# Patient Record
Sex: FEMALE | Race: ASIAN | NOT HISPANIC OR LATINO | Employment: OTHER | URBAN - METROPOLITAN AREA
[De-identification: names, ages, dates, MRNs, and addresses within clinical notes are randomized per-mention and may not be internally consistent; named-entity substitution may affect disease eponyms.]

---

## 2018-07-11 ENCOUNTER — OFFICE VISIT (OUTPATIENT)
Dept: PRIMARY CARE CLINIC | Facility: CLINIC | Age: 73
End: 2018-07-11
Payer: MEDICARE

## 2018-07-11 ENCOUNTER — TELEPHONE (OUTPATIENT)
Dept: PRIMARY CARE CLINIC | Facility: CLINIC | Age: 73
End: 2018-07-11

## 2018-07-11 VITALS
WEIGHT: 206.38 LBS | RESPIRATION RATE: 18 BRPM | OXYGEN SATURATION: 97 % | SYSTOLIC BLOOD PRESSURE: 137 MMHG | DIASTOLIC BLOOD PRESSURE: 84 MMHG | HEIGHT: 63 IN | BODY MASS INDEX: 36.57 KG/M2 | TEMPERATURE: 98 F | HEART RATE: 81 BPM

## 2018-07-11 DIAGNOSIS — M54.42 ACUTE MIDLINE LOW BACK PAIN WITH BILATERAL SCIATICA: Primary | ICD-10-CM
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
## 2018-07-11 DIAGNOSIS — M51.9 LUMBAR DISC DISEASE: ICD-10-CM

## 2018-07-11 DIAGNOSIS — M54.41 ACUTE MIDLINE LOW BACK PAIN WITH BILATERAL SCIATICA: Primary | ICD-10-CM

## 2018-07-11 DIAGNOSIS — S32.009A LUMBAR VERTERBRAL FRACTURE, TRAUMATIC: ICD-10-CM

## 2018-07-11 DIAGNOSIS — E11.9 TYPE 2 DIABETES MELLITUS WITHOUT COMPLICATION, WITHOUT LONG-TERM CURRENT USE OF INSULIN: ICD-10-CM

## 2018-07-11 PROCEDURE — 99213 OFFICE O/P EST LOW 20 MIN: CPT | Mod: S$PBB,,, | Performed by: INTERNAL MEDICINE

## 2018-07-11 PROCEDURE — 96372 THER/PROPH/DIAG INJ SC/IM: CPT | Mod: PBBFAC,PN

## 2018-07-11 PROCEDURE — 99999 PR PBB SHADOW E&M-EST. PATIENT-LVL IV: CPT | Mod: PBBFAC,,, | Performed by: INTERNAL MEDICINE

## 2018-07-11 PROCEDURE — 99214 OFFICE O/P EST MOD 30 MIN: CPT | Mod: PBBFAC,PN | Performed by: INTERNAL MEDICINE

## 2018-07-11 RX ORDER — ATORVASTATIN CALCIUM 10 MG/1
10 TABLET, FILM COATED ORAL DAILY
Qty: 90 TABLET | Refills: 3 | Status: SHIPPED | OUTPATIENT
Start: 2018-07-11 | End: 2019-02-26 | Stop reason: SDUPTHER

## 2018-07-11 RX ORDER — KETOROLAC TROMETHAMINE 30 MG/ML
30 INJECTION, SOLUTION INTRAMUSCULAR; INTRAVENOUS
Status: COMPLETED | OUTPATIENT
Start: 2018-07-11 | End: 2018-07-11

## 2018-07-11 RX ORDER — SITAGLIPTIN 100 MG/1
1 TABLET, FILM COATED ORAL DAILY
COMMUNITY
Start: 2018-06-28 | End: 2018-07-11 | Stop reason: SDUPTHER

## 2018-07-11 RX ORDER — OMEPRAZOLE 40 MG/1
40 CAPSULE, DELAYED RELEASE ORAL DAILY PRN
COMMUNITY
Start: 2018-06-28 | End: 2018-07-11 | Stop reason: SDUPTHER

## 2018-07-11 RX ORDER — GLIMEPIRIDE 4 MG/1
1 TABLET ORAL 2 TIMES DAILY
COMMUNITY
Start: 2018-06-28 | End: 2018-07-11 | Stop reason: SDUPTHER

## 2018-07-11 RX ORDER — PREDNISONE 20 MG/1
20 TABLET ORAL 2 TIMES DAILY
Qty: 14 TABLET | Refills: 0 | Status: SHIPPED | OUTPATIENT
Start: 2018-07-11 | End: 2018-07-11 | Stop reason: SDUPTHER

## 2018-07-11 RX ORDER — VALSARTAN 320 MG/1
320 TABLET ORAL NIGHTLY
Qty: 90 TABLET | Refills: 3 | Status: SHIPPED | OUTPATIENT
Start: 2018-07-11 | End: 2018-08-09

## 2018-07-11 RX ORDER — OMEPRAZOLE 40 MG/1
40 CAPSULE, DELAYED RELEASE ORAL DAILY PRN
Qty: 90 CAPSULE | Refills: 3 | Status: SHIPPED | OUTPATIENT
Start: 2018-07-11 | End: 2018-10-15

## 2018-07-11 RX ORDER — PREDNISONE 20 MG/1
20 TABLET ORAL 2 TIMES DAILY
Qty: 14 TABLET | Refills: 0 | Status: SHIPPED | OUTPATIENT
Start: 2018-07-11 | End: 2018-07-18

## 2018-07-11 RX ORDER — GLIMEPIRIDE 4 MG/1
4 TABLET ORAL 2 TIMES DAILY
Qty: 90 TABLET | Refills: 3 | Status: SHIPPED | OUTPATIENT
Start: 2018-07-11 | End: 2019-02-26 | Stop reason: SDUPTHER

## 2018-07-11 RX ORDER — VALSARTAN 320 MG/1
1 TABLET ORAL NIGHTLY
COMMUNITY
Start: 2018-06-28 | End: 2018-07-11 | Stop reason: SDUPTHER

## 2018-07-11 RX ORDER — TIZANIDINE 4 MG/1
4 TABLET ORAL EVERY 8 HOURS
Qty: 30 TABLET | Refills: 0 | Status: SHIPPED | OUTPATIENT
Start: 2018-07-11 | End: 2018-07-13

## 2018-07-11 RX ORDER — ATORVASTATIN CALCIUM 10 MG/1
10 TABLET, FILM COATED ORAL DAILY
COMMUNITY
Start: 2018-06-28 | End: 2018-07-11 | Stop reason: SDUPTHER

## 2018-07-11 RX ORDER — METFORMIN HYDROCHLORIDE 1000 MG/1
1000 TABLET ORAL 2 TIMES DAILY
Qty: 90 TABLET | Refills: 3 | Status: SHIPPED | OUTPATIENT
Start: 2018-07-11 | End: 2019-02-26 | Stop reason: SDUPTHER

## 2018-07-11 RX ORDER — AMLODIPINE BESYLATE 10 MG/1
10 TABLET ORAL DAILY
COMMUNITY
Start: 2018-06-28 | End: 2018-07-11 | Stop reason: SDUPTHER

## 2018-07-11 RX ORDER — METFORMIN HYDROCHLORIDE 1000 MG/1
1 TABLET ORAL 2 TIMES DAILY
COMMUNITY
Start: 2018-06-28 | End: 2018-07-11 | Stop reason: SDUPTHER

## 2018-07-11 RX ORDER — TRAMADOL HYDROCHLORIDE 50 MG/1
50 TABLET ORAL EVERY 8 HOURS PRN
Qty: 30 TABLET | Refills: 0 | Status: SHIPPED | OUTPATIENT
Start: 2018-07-11 | End: 2018-07-13

## 2018-07-11 RX ORDER — AMLODIPINE BESYLATE 10 MG/1
10 TABLET ORAL DAILY
Qty: 90 TABLET | Refills: 3 | Status: SHIPPED | OUTPATIENT
Start: 2018-07-11 | End: 2019-02-26 | Stop reason: SDUPTHER

## 2018-07-11 RX ORDER — SITAGLIPTIN 100 MG/1
100 TABLET, FILM COATED ORAL DAILY
Qty: 90 TABLET | Refills: 3 | Status: SHIPPED | OUTPATIENT
Start: 2018-07-11 | End: 2019-02-26 | Stop reason: SDUPTHER

## 2018-07-11 RX ADMIN — KETOROLAC TROMETHAMINE 30 MG: 30 INJECTION, SOLUTION INTRAMUSCULAR at 03:07

## 2018-07-11 NOTE — PROGRESS NOTES
Patient ID by name and . NKDA. Toradol 30 mg IM injection given IM in left ventrogluteal using aseptic technique. Aspirated with no blood noted. Patient tolerated well. Given per physicians order. No adverse reaction noted.

## 2018-07-11 NOTE — TELEPHONE ENCOUNTER
----- Message from Jayla Motta sent at 7/11/2018  1:26 PM CDT -----  Contact: Daughter-in-law  Type:  Patient Returning Call    Who Called:  Marychuy, daughter-in-law  Who Left Message for Patient:  Josey  Does the patient know what this is regarding?:  Same day appointment  Best Call Back Number:  [phone]  Additional Information:  Missed your call, please call her back. Thanks.

## 2018-07-11 NOTE — TELEPHONE ENCOUNTER
----- Message from Michelle Hernandez sent at 7/11/2018 12:27 PM CDT -----  Contact: daughter  Marychuy Tracy   [phone]  This will be a new patient her daughter spoke to your sister and asked if you will see her mother today she fell and hurt her  Back. please call back at [phone]

## 2018-07-12 NOTE — PATIENT INSTRUCTIONS
May need MRI and discuss PT after xray result  May need increase DM meds few days while on po prednisone accucheck was 190 in office today

## 2018-07-12 NOTE — PROGRESS NOTES
Subjective:       Patient ID: Fredi Layne is a 73 y.o. female.    Chief Complaint: Back Pain    HPI  Pt with h/o lS disc ds with spinal stenoses in past had SEI still with pain on and off few days ago help  get up pt slipped from chair landed on buttock c/o increase lower back pain since then radiate to lower abdomen increase when ambulating  Review of Systems    Objective:      Physical Exam   Constitutional: She is oriented to person, place, and time. She appears well-developed and well-nourished. No distress.   HENT:   Head: Normocephalic and atraumatic.   Right Ear: External ear normal.   Left Ear: External ear normal.   Nose: Nose normal.   Mouth/Throat: Oropharynx is clear and moist. No oropharyngeal exudate.   Eyes: Conjunctivae and EOM are normal. Pupils are equal, round, and reactive to light. Right eye exhibits no discharge. Left eye exhibits no discharge.   Neck: Normal range of motion. Neck supple. No thyromegaly present.   Cardiovascular: Normal rate, regular rhythm, normal heart sounds and intact distal pulses.  Exam reveals no gallop and no friction rub.    No murmur heard.  Pulmonary/Chest: Effort normal and breath sounds normal. No respiratory distress. She has no wheezes. She has no rales. She exhibits no tenderness.   Abdominal: Soft. Bowel sounds are normal. She exhibits no distension. There is no tenderness. There is no rebound and no guarding.   Musculoskeletal: Normal range of motion. She exhibits no edema, tenderness or deformity.   Lymphadenopathy:     She has no cervical adenopathy.   Neurological: She is alert and oriented to person, place, and time.   Skin: Skin is warm and dry. Capillary refill takes less than 2 seconds. No rash noted. No erythema.   Psychiatric: She has a normal mood and affect. Judgment and thought content normal.   Nursing note and vitals reviewed.      Assessment:       1. Acute midline low back pain with bilateral sciatica    2. Lumbar disc disease    3. Fall,  initial encounter    4. Type 2 diabetes mellitus without complication, without long-term current use of insulin    5. Lumbar verterbral fracture, traumatic        Plan:       Acute midline low back pain with bilateral sciatica    Lumbar disc disease  -     omeprazole (PRILOSEC) 40 MG capsule; Take 1 capsule (40 mg total) by mouth daily as needed.  Dispense: 90 capsule; Refill: 3  -     amLODIPine (NORVASC) 10 MG tablet; Take 1 tablet (10 mg total) by mouth once daily.  Dispense: 90 tablet; Refill: 3  -     Discontinue: predniSONE (DELTASONE) 20 MG tablet; Take 1 tablet (20 mg total) by mouth 2 (two) times daily. for 7 days  Dispense: 14 tablet; Refill: 0  -     tiZANidine (ZANAFLEX) 4 MG tablet; Take 1 tablet (4 mg total) by mouth every 8 (eight) hours. for 10 days  Dispense: 30 tablet; Refill: 0  -     predniSONE (DELTASONE) 20 MG tablet; Take 1 tablet (20 mg total) by mouth 2 (two) times daily. for 7 days  Dispense: 14 tablet; Refill: 0    Fall, initial encounter    Type 2 diabetes mellitus without complication, without long-term current use of insulin  -     valsartan (DIOVAN) 320 MG tablet; Take 1 tablet (320 mg total) by mouth every evening.  Dispense: 90 tablet; Refill: 3  -     metFORMIN (GLUCOPHAGE) 1000 MG tablet; Take 1 tablet (1,000 mg total) by mouth 2 (two) times daily.  Dispense: 90 tablet; Refill: 3  -     JANUVIA 100 mg Tab; Take 1 tablet (100 mg total) by mouth once daily.  Dispense: 90 tablet; Refill: 3  -     glimepiride (AMARYL) 4 MG tablet; Take 1 tablet (4 mg total) by mouth 2 (two) times daily.  Dispense: 90 tablet; Refill: 3  -     atorvastatin (LIPITOR) 10 MG tablet; Take 1 tablet (10 mg total) by mouth once daily.  Dispense: 90 tablet; Refill: 3    Lumbar verterbral fracture, traumatic  -     X-Ray Lumbar Spine Ap And Lateral; Future; Expected date: 07/11/2018  -     traMADol (ULTRAM) 50 mg tablet; Take 1 tablet (50 mg total) by mouth every 8 (eight) hours as needed.  Dispense: 30 tablet;  Refill: 0  -     ketorolac injection 30 mg; Inject 1 mL (30 mg total) into the muscle one time.

## 2018-07-13 ENCOUNTER — TELEPHONE (OUTPATIENT)
Dept: PRIMARY CARE CLINIC | Facility: CLINIC | Age: 73
End: 2018-07-13

## 2018-07-13 NOTE — TELEPHONE ENCOUNTER
----- Message from Keishahilda Brice sent at 7/13/2018  2:43 PM CDT -----  Patients son states that he need to speak to you in regards to patients test results.  Please call Carlton at [phone].

## 2018-07-16 ENCOUNTER — OFFICE VISIT (OUTPATIENT)
Dept: PRIMARY CARE CLINIC | Facility: CLINIC | Age: 73
End: 2018-07-16
Payer: MEDICARE

## 2018-07-16 VITALS
OXYGEN SATURATION: 95 % | SYSTOLIC BLOOD PRESSURE: 126 MMHG | TEMPERATURE: 98 F | HEIGHT: 62 IN | RESPIRATION RATE: 18 BRPM | HEART RATE: 72 BPM | BODY MASS INDEX: 37.91 KG/M2 | WEIGHT: 206 LBS | DIASTOLIC BLOOD PRESSURE: 78 MMHG

## 2018-07-16 DIAGNOSIS — M51.9 LUMBAR DISC DISEASE: ICD-10-CM

## 2018-07-16 DIAGNOSIS — M54.42 ACUTE MIDLINE LOW BACK PAIN WITH BILATERAL SCIATICA: Primary | ICD-10-CM

## 2018-07-16 DIAGNOSIS — E11.9 TYPE 2 DIABETES MELLITUS WITHOUT COMPLICATION, WITHOUT LONG-TERM CURRENT USE OF INSULIN: ICD-10-CM

## 2018-07-16 DIAGNOSIS — S32.000A LUMBAR COMPRESSION FRACTURE, CLOSED, INITIAL ENCOUNTER: ICD-10-CM

## 2018-07-16 DIAGNOSIS — R29.898 WEAKNESS OF BOTH LEGS: ICD-10-CM

## 2018-07-16 DIAGNOSIS — S32.009A LUMBAR VERTERBRAL FRACTURE, TRAUMATIC: ICD-10-CM

## 2018-07-16 DIAGNOSIS — F41.0 PANIC ATTACK: ICD-10-CM

## 2018-07-16 DIAGNOSIS — M54.41 ACUTE MIDLINE LOW BACK PAIN WITH BILATERAL SCIATICA: Primary | ICD-10-CM

## 2018-07-16 PROCEDURE — 99999 PR PBB SHADOW E&M-EST. PATIENT-LVL IV: CPT | Mod: PBBFAC,,, | Performed by: INTERNAL MEDICINE

## 2018-07-16 PROCEDURE — 99213 OFFICE O/P EST LOW 20 MIN: CPT | Mod: S$PBB,,, | Performed by: INTERNAL MEDICINE

## 2018-07-16 PROCEDURE — 99214 OFFICE O/P EST MOD 30 MIN: CPT | Mod: PBBFAC,PN | Performed by: INTERNAL MEDICINE

## 2018-07-16 RX ORDER — ALPRAZOLAM 1 MG/1
1 TABLET ORAL 2 TIMES DAILY
Qty: 60 TABLET | Refills: 0 | Status: SHIPPED | OUTPATIENT
Start: 2018-07-16 | End: 2018-10-15

## 2018-07-16 RX ORDER — OXYCODONE AND ACETAMINOPHEN 5; 325 MG/1; MG/1
1 TABLET ORAL EVERY 6 HOURS PRN
Qty: 28 TABLET | Refills: 0 | Status: SHIPPED | OUTPATIENT
Start: 2018-07-16 | End: 2018-10-15

## 2018-07-16 NOTE — PROGRESS NOTES
Subjective:       Patient ID: Fredi Layne is a 73 y.o. female.    Chief Complaint: Lumbar Spine Pain (L-Spine)    HPI  Pt still in a lot of pain specially whenn try stading up and both legs hurt and weak use wheel chair no fever chill have alolt of anxiety claustrophobic  Review of Systems    Objective:      Physical Exam   Constitutional: She is oriented to person, place, and time. She appears well-developed and well-nourished. No distress.   Wheel chair   HENT:   Head: Normocephalic and atraumatic.   Right Ear: External ear normal.   Left Ear: External ear normal.   Nose: Nose normal.   Mouth/Throat: Oropharynx is clear and moist. No oropharyngeal exudate.   Eyes: Conjunctivae and EOM are normal. Pupils are equal, round, and reactive to light. Right eye exhibits no discharge. Left eye exhibits no discharge.   Neck: Normal range of motion. Neck supple. No thyromegaly present.   Cardiovascular: Normal rate, regular rhythm, normal heart sounds and intact distal pulses.  Exam reveals no gallop and no friction rub.    No murmur heard.  Pulmonary/Chest: Effort normal and breath sounds normal. No respiratory distress. She has no wheezes. She has no rales. She exhibits no tenderness.   Abdominal: Soft. Bowel sounds are normal. She exhibits no distension. There is no tenderness. There is no rebound and no guarding.   Musculoskeletal: Normal range of motion. She exhibits tenderness (tenderness midback bilaterally). She exhibits no edema or deformity.   Lymphadenopathy:     She has no cervical adenopathy.   Neurological: She is alert and oriented to person, place, and time.   Skin: Skin is warm and dry. Capillary refill takes less than 2 seconds. No rash noted. No erythema.   Psychiatric: She has a normal mood and affect. Judgment and thought content normal.   Nursing note and vitals reviewed.      Assessment:       1. Acute midline low back pain with bilateral sciatica    2. Type 2 diabetes mellitus without complication, without  long-term current use of insulin    3. Lumbar compression fracture, closed, initial encounter    4. Lumbar disc disease    5. Lumbar verterbral fracture, traumatic    6. Panic attack    7. Weakness of both legs        Plan:       Acute midline low back pain with bilateral sciatica    Type 2 diabetes mellitus without complication, without long-term current use of insulin  -     Comprehensive metabolic panel; Future; Expected date: 07/16/2018  -     CBC auto differential; Future; Expected date: 07/16/2018  -     Hemoglobin A1c; Future; Expected date: 07/16/2018  -     Microalbumin/creatinine urine ratio; Future; Expected date: 07/16/2018    Lumbar compression fracture, closed, initial encounter  -     MRI Lumbar Spine Without Contrast; Future; Expected date: 07/16/2018  -     oxyCODONE-acetaminophen (PERCOCET) 5-325 mg per tablet; Take 1 tablet by mouth every 6 (six) hours as needed for Pain.  Dispense: 28 tablet; Refill: 0    Lumbar disc disease  -     MRI Lumbar Spine Without Contrast; Future; Expected date: 07/16/2018    Lumbar verterbral fracture, traumatic  -     MRI Lumbar Spine Without Contrast; Future; Expected date: 07/16/2018  -     oxyCODONE-acetaminophen (PERCOCET) 5-325 mg per tablet; Take 1 tablet by mouth every 6 (six) hours as needed for Pain.  Dispense: 28 tablet; Refill: 0    Panic attack  -     ALPRAZolam (XANAX) 1 MG tablet; Take 1 tablet (1 mg total) by mouth 2 (two) times daily.  Dispense: 60 tablet; Refill: 0    Weakness of both legs  -     CK; Future; Expected date: 07/16/2018

## 2018-07-18 ENCOUNTER — TELEPHONE (OUTPATIENT)
Dept: PRIMARY CARE CLINIC | Facility: CLINIC | Age: 73
End: 2018-07-18

## 2018-07-18 DIAGNOSIS — M51.9 LUMBAR DISC DISEASE: Primary | ICD-10-CM

## 2018-07-18 DIAGNOSIS — M48.061 SPINAL STENOSIS AT L4-L5 LEVEL: ICD-10-CM

## 2018-07-18 NOTE — TELEPHONE ENCOUNTER
Per PCP patient needs to see Neurosurgery. General  referral pended to PCP to sign. Please advise.

## 2018-07-18 NOTE — TELEPHONE ENCOUNTER
----- Message from Peggy Hernandez sent at 7/18/2018  3:41 PM CDT -----  Contact: Son Donnell  Type:  Patient Returning Call    Who Called:  Donnell  Who Left Message for Patient:  Josey  Does the patient know what this is regarding?:  Results and possible referral  Best Call Back Number:  [phone] (home)   Additional Information:  na

## 2018-07-18 NOTE — TELEPHONE ENCOUNTER
----- Message from Sourav Baumann MD sent at 7/18/2018  7:56 AM CDT -----  Please call the patient regarding her MRI LS let pt son know no new fx L1 fx was old has multiple ddd and disc herniation with spinal stenoses severe with nerve compression can you get pt to see Neurosurgeon at [facility] soon or Dr Tony GRISSOM at [facility]

## 2018-08-08 ENCOUNTER — CLINICAL SUPPORT (OUTPATIENT)
Dept: PRIMARY CARE CLINIC | Facility: CLINIC | Age: 73
End: 2018-08-08
Payer: MEDICARE

## 2018-08-08 ENCOUNTER — OFFICE VISIT (OUTPATIENT)
Dept: PRIMARY CARE CLINIC | Facility: CLINIC | Age: 73
End: 2018-08-08
Payer: MEDICARE

## 2018-08-08 VITALS
BODY MASS INDEX: 37.36 KG/M2 | HEART RATE: 78 BPM | HEIGHT: 62 IN | WEIGHT: 203 LBS | RESPIRATION RATE: 18 BRPM | SYSTOLIC BLOOD PRESSURE: 132 MMHG | OXYGEN SATURATION: 92 % | DIASTOLIC BLOOD PRESSURE: 80 MMHG | TEMPERATURE: 98 F

## 2018-08-08 DIAGNOSIS — G62.9 PERIPHERAL POLYNEUROPATHY: ICD-10-CM

## 2018-08-08 DIAGNOSIS — R10.84 GENERALIZED ABDOMINAL PAIN: Primary | ICD-10-CM

## 2018-08-08 DIAGNOSIS — M48.061 SPINAL STENOSIS AT L4-L5 LEVEL: ICD-10-CM

## 2018-08-08 DIAGNOSIS — S32.009A LUMBAR VERTERBRAL FRACTURE, TRAUMATIC: ICD-10-CM

## 2018-08-08 DIAGNOSIS — E11.9 TYPE 2 DIABETES MELLITUS WITHOUT COMPLICATION, WITHOUT LONG-TERM CURRENT USE OF INSULIN: ICD-10-CM

## 2018-08-08 LAB
ALBUMIN SERPL BCP-MCNC: 3.9 G/DL
ALBUMIN/CREAT UR: 284.6 UG/MG
ALP SERPL-CCNC: 101 U/L
ALT SERPL W/O P-5'-P-CCNC: 21 U/L
ANION GAP SERPL CALC-SCNC: 8 MMOL/L
AST SERPL-CCNC: 20 U/L
BASOPHILS # BLD AUTO: 0 K/UL
BASOPHILS NFR BLD: 0.3 %
BILIRUB SERPL-MCNC: 0.3 MG/DL
BUN SERPL-MCNC: 9 MG/DL
CALCIUM SERPL-MCNC: 9.5 MG/DL
CHLORIDE SERPL-SCNC: 95 MMOL/L
CHOLEST SERPL-MCNC: 147 MG/DL
CHOLEST/HDLC SERPL: 2.7 {RATIO}
CO2 SERPL-SCNC: 33 MMOL/L
CREAT SERPL-MCNC: 0.7 MG/DL
CREAT UR-MCNC: 26 MG/DL
DIFFERENTIAL METHOD: ABNORMAL
EOSINOPHIL # BLD AUTO: 0.2 K/UL
EOSINOPHIL NFR BLD: 2 %
ERYTHROCYTE [DISTWIDTH] IN BLOOD BY AUTOMATED COUNT: 14.4 %
EST. GFR  (AFRICAN AMERICAN): >60 ML/MIN/1.73 M^2
EST. GFR  (NON AFRICAN AMERICAN): >60 ML/MIN/1.73 M^2
ESTIMATED AVG GLUCOSE: 171 MG/DL
GLUCOSE SERPL-MCNC: 69 MG/DL
HBA1C MFR BLD HPLC: 7.6 %
HCT VFR BLD AUTO: 41.5 %
HDLC SERPL-MCNC: 54 MG/DL
HDLC SERPL: 36.7 %
HGB BLD-MCNC: 13.3 G/DL
LDLC SERPL CALC-MCNC: 68 MG/DL
LYMPHOCYTES # BLD AUTO: 2.5 K/UL
LYMPHOCYTES NFR BLD: 20.2 %
MCH RBC QN AUTO: 26.2 PG
MCHC RBC AUTO-ENTMCNC: 32 G/DL
MCV RBC AUTO: 82 FL
MICROALBUMIN UR DL<=1MG/L-MCNC: 74 UG/ML
MONOCYTES # BLD AUTO: 0.6 K/UL
MONOCYTES NFR BLD: 5 %
NEUTROPHILS # BLD AUTO: 9.1 K/UL
NEUTROPHILS NFR BLD: 72.5 %
NONHDLC SERPL-MCNC: 93 MG/DL
PLATELET # BLD AUTO: 397 K/UL
PMV BLD AUTO: 8.2 FL
POTASSIUM SERPL-SCNC: 4.1 MMOL/L
PROT SERPL-MCNC: 8.4 G/DL
RBC # BLD AUTO: 5.07 M/UL
SODIUM SERPL-SCNC: 136 MMOL/L
TRIGL SERPL-MCNC: 127 MG/DL
TSH SERPL DL<=0.005 MIU/L-ACNC: 1.11 UIU/ML
WBC # BLD AUTO: 12.5 K/UL

## 2018-08-08 PROCEDURE — 99999 PR PBB SHADOW E&M-EST. PATIENT-LVL II: CPT | Mod: PBBFAC,,,

## 2018-08-08 PROCEDURE — 86803 HEPATITIS C AB TEST: CPT

## 2018-08-08 PROCEDURE — 80061 LIPID PANEL: CPT

## 2018-08-08 PROCEDURE — 99214 OFFICE O/P EST MOD 30 MIN: CPT | Mod: PBBFAC,27,PN | Performed by: INTERNAL MEDICINE

## 2018-08-08 PROCEDURE — 80053 COMPREHEN METABOLIC PANEL: CPT

## 2018-08-08 PROCEDURE — 99999 PR PBB SHADOW E&M-EST. PATIENT-LVL IV: CPT | Mod: PBBFAC,,, | Performed by: INTERNAL MEDICINE

## 2018-08-08 PROCEDURE — 99212 OFFICE O/P EST SF 10 MIN: CPT | Mod: PBBFAC,PN

## 2018-08-08 PROCEDURE — 83036 HEMOGLOBIN GLYCOSYLATED A1C: CPT

## 2018-08-08 PROCEDURE — 99213 OFFICE O/P EST LOW 20 MIN: CPT | Mod: S$PBB,,, | Performed by: INTERNAL MEDICINE

## 2018-08-08 PROCEDURE — 84443 ASSAY THYROID STIM HORMONE: CPT

## 2018-08-08 PROCEDURE — 82043 UR ALBUMIN QUANTITATIVE: CPT

## 2018-08-08 PROCEDURE — 85025 COMPLETE CBC W/AUTO DIFF WBC: CPT

## 2018-08-08 RX ORDER — GABAPENTIN 300 MG/1
300 CAPSULE ORAL 2 TIMES DAILY
Qty: 60 CAPSULE | Refills: 3 | Status: SHIPPED | OUTPATIENT
Start: 2018-08-08 | End: 2018-10-15

## 2018-08-08 RX ORDER — TIZANIDINE 4 MG/1
TABLET ORAL
COMMUNITY
Start: 2018-07-31 | End: 2018-10-15

## 2018-08-09 LAB — HCV AB SERPL QL IA: NEGATIVE

## 2018-08-09 RX ORDER — OLMESARTAN MEDOXOMIL 40 MG/1
40 TABLET ORAL DAILY
Qty: 90 TABLET | Refills: 3 | Status: SHIPPED | OUTPATIENT
Start: 2018-08-09 | End: 2019-02-26 | Stop reason: SDUPTHER

## 2018-08-09 NOTE — PROGRESS NOTES
Subjective:       Patient ID: Fredi Layne is a 73 y.o. female.    Chief Complaint: Labs / Bone Scan    HPI  Pt c/o lower back pain no new change has appt with NS next week and constipation ha sto take laxative sometime c/o indigestion has not been checking her DM at home no sob cp not able to get proper LS brace too small  Review of Systems    Objective:      Physical Exam   Constitutional: She is oriented to person, place, and time. She appears well-developed and well-nourished. No distress.   HENT:   Head: Normocephalic and atraumatic.   Right Ear: External ear normal.   Left Ear: External ear normal.   Nose: Nose normal.   Mouth/Throat: Oropharynx is clear and moist. No oropharyngeal exudate.   Eyes: Conjunctivae and EOM are normal. Pupils are equal, round, and reactive to light. Right eye exhibits no discharge. Left eye exhibits no discharge.   Neck: Normal range of motion. Neck supple. No thyromegaly present.   Cardiovascular: Normal rate, regular rhythm, normal heart sounds and intact distal pulses.  Exam reveals no gallop and no friction rub.    No murmur heard.  Pulmonary/Chest: Effort normal and breath sounds normal. No respiratory distress. She has no wheezes. She has no rales. She exhibits no tenderness.   Abdominal: Soft. Bowel sounds are normal. She exhibits no distension. There is no tenderness. There is no rebound and no guarding.   Musculoskeletal: Normal range of motion. She exhibits tenderness (tenderness in lowe back with palpation L2-3). She exhibits no edema or deformity.   Lymphadenopathy:     She has no cervical adenopathy.   Neurological: She is alert and oriented to person, place, and time.   Skin: Skin is warm and dry. Capillary refill takes less than 2 seconds. No rash noted. No erythema.   Psychiatric: She has a normal mood and affect. Judgment and thought content normal.   Nursing note and vitals reviewed.      Assessment:       1. Generalized abdominal pain    2. Lumbar verterbral fracture,  traumatic    3. Type 2 diabetes mellitus without complication, without long-term current use of insulin    4. Peripheral polyneuropathy    5. Spinal stenosis at L4-L5 level        Plan:       Generalized abdominal pain  -     POCT URINE DIPSTICK WITHOUT MICROSCOPE  -     linaclotide 290 mcg Cap; Take 1 capsule (290 mcg total) by mouth once daily.  Dispense: 30 capsule; Refill: 5    Lumbar verterbral fracture, traumatic  -     DXA Bone Density Spine And Hip; Future; Expected date: 08/08/2018    Type 2 diabetes mellitus without complication, without long-term current use of insulin  -     CBC auto differential; Future; Expected date: 08/08/2018  -     Comprehensive metabolic panel; Future; Expected date: 08/08/2018  -     Lipid panel; Future; Expected date: 08/08/2018  -     POCT EKG 12-LEAD (NOT FOR [facility] USE)  -     Hemoglobin A1c; Future; Expected date: 08/08/2018  -     TSH; Future; Expected date: 08/08/2018  -     Microalbumin/creatinine urine ratio; Future; Expected date: 08/08/2018  -     Hepatitis C antibody; Future; Expected date: 08/08/2018    Peripheral polyneuropathy  -     gabapentin (NEURONTIN) 300 MG capsule; Take 1 capsule (300 mg total) by mouth 2 (two) times daily.  Dispense: 60 capsule; Refill: 3    Spinal stenosis at L4-L5 level  Comments:  await to see NS next week continue with back brace po meds PT

## 2018-08-16 ENCOUNTER — OFFICE VISIT (OUTPATIENT)
Dept: NEUROSURGERY | Facility: CLINIC | Age: 73
End: 2018-08-16
Payer: MEDICARE

## 2018-08-16 VITALS
BODY MASS INDEX: 37.13 KG/M2 | TEMPERATURE: 98 F | WEIGHT: 203 LBS | HEART RATE: 71 BPM | DIASTOLIC BLOOD PRESSURE: 58 MMHG | SYSTOLIC BLOOD PRESSURE: 120 MMHG

## 2018-08-16 DIAGNOSIS — M43.16 SPONDYLOLISTHESIS AT L4-L5 LEVEL: Primary | ICD-10-CM

## 2018-08-16 DIAGNOSIS — S32.010D CLOSED COMPRESSION FRACTURE OF L1 LUMBAR VERTEBRA WITH ROUTINE HEALING, SUBSEQUENT ENCOUNTER: ICD-10-CM

## 2018-08-16 DIAGNOSIS — M48.061 SPINAL STENOSIS OF LUMBAR REGION WITHOUT NEUROGENIC CLAUDICATION: ICD-10-CM

## 2018-08-16 DIAGNOSIS — E66.01 MORBID OBESITY DUE TO EXCESS CALORIES: ICD-10-CM

## 2018-08-16 DIAGNOSIS — M54.50 LOW BACK PAIN, NON-SPECIFIC: ICD-10-CM

## 2018-08-16 PROCEDURE — 99999 PR PBB SHADOW E&M-EST. PATIENT-LVL IV: CPT | Mod: PBBFAC,,, | Performed by: NEUROLOGICAL SURGERY

## 2018-08-16 PROCEDURE — 99204 OFFICE O/P NEW MOD 45 MIN: CPT | Mod: S$PBB,,, | Performed by: NEUROLOGICAL SURGERY

## 2018-08-16 PROCEDURE — 99214 OFFICE O/P EST MOD 30 MIN: CPT | Mod: PBBFAC | Performed by: NEUROLOGICAL SURGERY

## 2018-08-16 NOTE — PROGRESS NOTES
History & Physical    I, Otto Jenkins, attest that this documentation has been prepared under the direction and in the presence of Jareth Zamudio MD.    08/17/2018    Chief Complaint   Patient presents with    Consult       History of Present Illness:  Fredi Layne is a 73 y.o. patient with history of osteopenia who was referred to me by Dr. Sourav Baumann MD.     According to patient's son, patient has a remote history of fall in 1991 that required hospitalization in [place]. Unclear whether patient had any bony injury, although she had continued walking with mild intermittent back pain. 6 months ago the patient fell again, was told that she had a fracture, and was referred to me for further evaluation.   Patient complains of low back pain. The pain is 8-10/10 in intensity. Her pain is constant, gets worse with standing up and sitting down, and improves when walking or when laying flat. Patient states that when doing the dishes, she feels more comfortable if she leans on the sink. Patient's son does not think that she can walk further than 2 blocks secondary exacerbation of her back pain. Patient denies any bowel/bladder incontinence.   Patient is also complaining of left shoulder pain that radiates down the left arm, associated with numbness of her whole hand. The numbness is worst at her thumb. Symptoms are alleviated with gabapentin.     Review of patient's allergies indicates:  No Known Allergies    Current Outpatient Medications   Medication Sig Dispense Refill    amLODIPine (NORVASC) 10 MG tablet Take 1 tablet (10 mg total) by mouth once daily. 90 tablet 3    atorvastatin (LIPITOR) 10 MG tablet Take 1 tablet (10 mg total) by mouth once daily. 90 tablet 3    gabapentin (NEURONTIN) 300 MG capsule Take 1 capsule (300 mg total) by mouth 2 (two) times daily. 60 capsule 3    glimepiride (AMARYL) 4 MG tablet Take 1 tablet (4 mg total) by mouth 2 (two) times daily. 90 tablet 3    JANUVIA 100 mg Tab Take 1 tablet  (100 mg total) by mouth once daily. 90 tablet 3    linaclotide 290 mcg Cap Take 1 capsule (290 mcg total) by mouth once daily. 30 capsule 5    metFORMIN (GLUCOPHAGE) 1000 MG tablet Take 1 tablet (1,000 mg total) by mouth 2 (two) times daily. 90 tablet 3    olmesartan (BENICAR) 40 MG tablet Take 1 tablet (40 mg total) by mouth once daily. 90 tablet 3    omeprazole (PRILOSEC) 40 MG capsule Take 1 capsule (40 mg total) by mouth daily as needed. 90 capsule 3    oxyCODONE-acetaminophen (PERCOCET) 5-325 mg per tablet Take 1 tablet by mouth every 6 (six) hours as needed for Pain. 28 tablet 0    tiZANidine (ZANAFLEX) 4 MG tablet       ALPRAZolam (XANAX) 1 MG tablet Take 1 tablet (1 mg total) by mouth 2 (two) times daily. 60 tablet 0     No current facility-administered medications for this visit.        Past Medical History:   Diagnosis Date    Diabetes     Diabetes mellitus, type 2     Hyperlipidemia     Hypertension     Obesity        Past Surgical History:   Procedure Laterality Date    HYSTERECTOMY      OOPHORECTOMY      bilateral removal       Family History   Family history unknown: Yes       Social History     Tobacco Use    Smoking status: Never Smoker    Smokeless tobacco: Never Used   Substance Use Topics    Alcohol use: No    Drug use: No        Review of Systems:  Review of Systems   Constitutional: Negative for activity change.   HENT: Negative for congestion.    Eyes: Negative for discharge.   Respiratory: Negative for apnea.    Cardiovascular: Negative for chest pain.   Gastrointestinal: Negative for abdominal distention.   Endocrine: Negative for cold intolerance.   Genitourinary: Negative for difficulty urinating.   Musculoskeletal: Positive for back pain. Negative for arthralgias.   Neurological: Positive for numbness. Negative for dizziness, weakness and headaches.   Psychiatric/Behavioral: Negative for agitation.       Vital Signs (Most Recent)  Temp: 98.3 °F (36.8 °C) (08/16/18  1322)  Pulse: 71 (08/16/18 1322)  BP: (!) 120/58 (08/16/18 1322)     92.1 kg (203 lb)       Physical Exam:  Physical Exam:    Constitutional: She appears well-developed.     Eyes: Pupils are equal, round, and reactive to light. EOM are normal. Right eye exhibits no discharge. Left eye exhibits no discharge.     Abdominal: Soft.     Skin: Skin displays no rash on trunk and no rash on extremities.     Psych/Behavior: She is alert. She is oriented to person, place, and time.     Musculoskeletal:        Neck: There is no tenderness.        Back: Range of motion is full.        Right Upper Extremities: Range of motion is full. Muscle strength is 5/5. Tone is normal.        Left Upper Extremities: Range of motion is full. Muscle strength is 5/5. Tone is normal.       Right Lower Extremities: Range of motion is full. Muscle strength is 5/5. Tone is normal.        Left Lower Extremities: Range of motion is full. Muscle strength is 5/5. Tone is normal.     Neurological:        Coordination: She has a normal Romberg Test and normal tandem walking coordination.        Sensory: There is no sensory deficit in the trunk. There is no sensory deficit in the extremities.        DTRs: DTRs are DTRS NORMAL AND SYMMETRICnormal and symmetric. Tricep reflexes are 2+ on the right side and 2+ on the left side. Bicep reflexes are 2+ on the right side and 2+ on the left side. Brachioradialis reflexes are 2+ on the right side and 2+ on the left side. Patellar reflexes are 2+ on the right side and 2+ on the left side. Achilles reflexes are 2+ on the right side and 2+ on the left side. She displays no Babinski's sign on the right side. She displays no Babinski's sign on the left side.        Cranial nerves: Cranial nerve(s) II, III, IV, V, VI, VII, VIII, IX, X, XI and XII are intact.     Negative Scanlon's bilaterally.    negative straight leg raising test    Laboratory  CBC: Reviewed  CMP: Reviewed    Diagnostic Results:  MRI: Reviewed  MRI  Lumbar Spine Without Contrast, dated 7/16/2018: Reviewed personally, and discussed them with the patient and her son.   1. Chronic L1 vertebral body large central Schmorl's node and compression fracture which is remote, does not show any retropulsed fragments or cord compression.  2. Small endplate Schmorl's nodes T12 without edema.  3. Multilevel discogenic and spondylitic disease with varying degrees of central canal and/or foraminal stenosis.  4. Grade 1 spondylolisthesis L4-5 with severe facet and ligamentous hypertrophy causing severe central canal stenosis mild-to-moderate bilateral foraminal stenosis.  The preliminary and final reports are partially concordant.      ASSESSMENT/PLAN:       ICD-10-CM ICD-9-CM   1. Spondylolisthesis at L4-L5 level M43.16 756.12   2. Low back pain, non-specific M54.5 724.2   3. Closed compression fracture of L1 lumbar vertebra with routine healing, subsequent encounter S32.010D V54.17   4. Spinal stenosis of lumbar region without neurogenic claudication M48.061 724.02   5. Morbid obesity due to excess calories E66.01 278.01       PLAN:    1. Multilevel lumbar spondylosis with an old L1 fracture and L4-5 grade I spondylolisthesis with severe stenosis and chronic low back pain in the setting of morbid obesity, and diffuse lumbar spondylosis. We  Will start aggressive conservative management, will refer her for L4-5 injections and start her on Celebrex for 2 weeks. I will also get a CT scan of the lumbar spine to evaluate if there is bilateral pars fractures at L4 and will get flexion extension x-rays of the lumbar spine to evaluate for dynamic instability.     2. RTC in 6 weeks.     3. I have recommended the patient to get into a weight loss regimen and I have explained to her that it is critical in order to have a healthy back.     4. I spent 35 minutes with the patient, 50% of time in counseling.     Xa was seen today for consult.    Diagnoses and all orders for this  visit:    Spondylolisthesis at L4-L5 level    Low back pain, non-specific  -     IR LEROY Lumbar w/ Img; Future  -     IR LEROY Lumbar; Future  -     celecoxib (CELEBREX) 200 MG capsule; Take 1 capsule (200 mg total) by mouth 2 (two) times daily. for 14 days  -     CT Lumbar Spine Without Contrast; Future  -     X-Ray Lumbar Spine Flexion And Extension Only; Future    Closed compression fracture of L1 lumbar vertebra with routine healing, subsequent encounter    Spinal stenosis of lumbar region without neurogenic claudication    Morbid obesity due to excess calories        I, Dr. Jareth Zamudio, personally performed the services described in this documentation. All medical record entries made by the scribe were at my direction and in my presence.  I have reviewed the chart and agree that the record reflects my personal performance and is accurate and complete. Jareth Zamudio MD.  10:40 AM 08/17/2018

## 2018-08-16 NOTE — LETTER
August 17, 2018      Sourav Baumann MD  [address] [place] [address]           [facility] Neurosurgery 7th Fl  [address]  [facility] [address]  Phone: [phone]          Patient: Fredi Layne   MR Number: [id number]   YOB: 1945   Date of Visit: 8/16/2018       Dear Dr. Sourav Baumann:    Thank you for referring Fredi Layne to me for evaluation. Attached you will find relevant portions of my assessment and plan of care.    If you have questions, please do not hesitate to call me. I look forward to following Fredi Layne along with you.    Sincerely,    Jareth Zamudio MD    Enclosure  CC:  No Recipients    If you would like to receive this communication electronically, please contact [email or web address] or [phone] to request more information on [employer] access.    For providers and/or their staff who would like to refer a patient to [facility], please contact us through our one-stop-shop provider referral line, [facility], at [phone].    If you feel you have received this communication in error or would no longer like to receive these types of communications, please e-mail [email or web address]

## 2018-08-17 PROBLEM — E66.01 MORBID OBESITY DUE TO EXCESS CALORIES: Status: ACTIVE | Noted: 2018-08-17

## 2018-08-17 PROBLEM — M43.16 SPONDYLOLISTHESIS AT L4-L5 LEVEL: Status: ACTIVE | Noted: 2018-08-17

## 2018-08-17 PROBLEM — S32.010A CLOSED COMPRESSION FRACTURE OF L1 LUMBAR VERTEBRA: Status: ACTIVE | Noted: 2018-08-17

## 2018-08-17 PROBLEM — M48.061 SPINAL STENOSIS OF LUMBAR REGION WITHOUT NEUROGENIC CLAUDICATION: Status: ACTIVE | Noted: 2018-08-17

## 2018-08-17 PROBLEM — M54.50 LOW BACK PAIN, NON-SPECIFIC: Status: ACTIVE | Noted: 2018-08-17

## 2018-08-17 RX ORDER — CELECOXIB 200 MG/1
200 CAPSULE ORAL 2 TIMES DAILY
Qty: 28 CAPSULE | Refills: 1 | Status: SHIPPED | OUTPATIENT
Start: 2018-08-17 | End: 2018-08-31

## 2018-09-21 ENCOUNTER — TELEPHONE (OUTPATIENT)
Dept: INTERVENTIONAL RADIOLOGY/VASCULAR | Facility: HOSPITAL | Age: 73
End: 2018-09-21

## 2018-09-24 ENCOUNTER — HOSPITAL ENCOUNTER (OUTPATIENT)
Dept: INTERVENTIONAL RADIOLOGY/VASCULAR | Facility: HOSPITAL | Age: 73
Discharge: HOME | End: 2018-09-24
Attending: NEUROLOGICAL SURGERY
Payer: MEDICARE

## 2018-09-24 DIAGNOSIS — M54.50 LOW BACK PAIN, NON-SPECIFIC: ICD-10-CM

## 2018-09-26 ENCOUNTER — TELEPHONE (OUTPATIENT)
Dept: INTERVENTIONAL RADIOLOGY/VASCULAR | Facility: HOSPITAL | Age: 73
End: 2018-09-26

## 2018-09-27 ENCOUNTER — TELEPHONE (OUTPATIENT)
Dept: PRIMARY CARE CLINIC | Facility: CLINIC | Age: 73
End: 2018-09-27

## 2018-09-27 DIAGNOSIS — R60.0 PERIPHERAL EDEMA: Primary | ICD-10-CM

## 2018-09-27 RX ORDER — HYDROCHLOROTHIAZIDE 25 MG/1
25 TABLET ORAL DAILY
Qty: 30 TABLET | Refills: 11 | Status: SHIPPED | OUTPATIENT
Start: 2018-09-27 | End: 2018-10-15

## 2018-10-15 ENCOUNTER — OFFICE VISIT (OUTPATIENT)
Dept: PRIMARY CARE CLINIC | Facility: CLINIC | Age: 73
End: 2018-10-15
Payer: MEDICARE

## 2018-10-15 VITALS
DIASTOLIC BLOOD PRESSURE: 56 MMHG | TEMPERATURE: 99 F | SYSTOLIC BLOOD PRESSURE: 109 MMHG | RESPIRATION RATE: 18 BRPM | BODY MASS INDEX: 37.13 KG/M2 | OXYGEN SATURATION: 93 % | HEIGHT: 62 IN | HEART RATE: 81 BPM

## 2018-10-15 DIAGNOSIS — M48.061 SPINAL STENOSIS OF LUMBAR REGION WITHOUT NEUROGENIC CLAUDICATION: ICD-10-CM

## 2018-10-15 DIAGNOSIS — E11.9 TYPE 2 DIABETES MELLITUS WITHOUT COMPLICATION, WITHOUT LONG-TERM CURRENT USE OF INSULIN: ICD-10-CM

## 2018-10-15 DIAGNOSIS — S32.000A LUMBAR COMPRESSION FRACTURE, CLOSED, INITIAL ENCOUNTER: ICD-10-CM

## 2018-10-15 DIAGNOSIS — J40 BRONCHITIS: Primary | ICD-10-CM

## 2018-10-15 PROCEDURE — 99999 PR PBB SHADOW E&M-EST. PATIENT-LVL III: CPT | Mod: PBBFAC,,, | Performed by: INTERNAL MEDICINE

## 2018-10-15 PROCEDURE — 99213 OFFICE O/P EST LOW 20 MIN: CPT | Mod: S$PBB,,, | Performed by: INTERNAL MEDICINE

## 2018-10-15 PROCEDURE — 99213 OFFICE O/P EST LOW 20 MIN: CPT | Mod: PBBFAC,PN | Performed by: INTERNAL MEDICINE

## 2018-10-15 RX ORDER — BUTYROSPERMUM PARKII(SHEA BUTTER), SIMMONDSIA CHINENSIS (JOJOBA) SEED OIL, ALOE BARBADENSIS LEAF EXTRACT .01; 1; 3.5 G/100G; G/100G; G/100G
250 LIQUID TOPICAL 2 TIMES DAILY
COMMUNITY

## 2018-10-15 RX ORDER — BUMETANIDE 1 MG/1
TABLET ORAL
Refills: 0 | COMMUNITY
Start: 2018-10-03

## 2018-10-15 RX ORDER — ASPIRIN 325 MG
325 TABLET ORAL DAILY
COMMUNITY

## 2018-10-17 NOTE — PROGRESS NOTES
Subjective:       Patient ID: Fredi Layne is a 73 y.o. female.    Chief Complaint: Hospital Follow Up and Review Medications\    HPI  Pt was in hospital for weakness fatigue had low Na and brochitis with sob which resolving now po diet better has chronic back pain ha sappt with new NS no sob cp no fever chill  Review of Systems    Objective:      Physical Exam   Constitutional: She is oriented to person, place, and time. She appears well-developed and well-nourished. No distress.   In wheel chair   HENT:   Head: Normocephalic and atraumatic.   Right Ear: External ear normal.   Left Ear: External ear normal.   Nose: Nose normal.   Mouth/Throat: Oropharynx is clear and moist. No oropharyngeal exudate.   Eyes: Conjunctivae and EOM are normal. Pupils are equal, round, and reactive to light. Right eye exhibits no discharge. Left eye exhibits no discharge.   Neck: Normal range of motion. Neck supple. No thyromegaly present.   Cardiovascular: Normal rate, regular rhythm, normal heart sounds and intact distal pulses. Exam reveals no gallop and no friction rub.   No murmur heard.  Pulmonary/Chest: Effort normal and breath sounds normal. No respiratory distress. She has no wheezes. She has no rales. She exhibits no tenderness.   Abdominal: Soft. Bowel sounds are normal. She exhibits no distension. There is no tenderness. There is no rebound and no guarding.   Musculoskeletal: Normal range of motion. She exhibits tenderness (tenderness across lower back). She exhibits no edema or deformity.   Lymphadenopathy:     She has no cervical adenopathy.   Neurological: She is alert and oriented to person, place, and time.   Skin: Skin is warm and dry. Capillary refill takes less than 2 seconds. No rash noted. No erythema.   Psychiatric: She has a normal mood and affect. Judgment and thought content normal.   Nursing note and vitals reviewed.      Assessment:       1. Bronchitis    2. Type 2 diabetes mellitus without complication, without  long-term current use of insulin    3. Lumbar compression fracture, closed, initial encounter    4. Spinal stenosis of lumbar region without neurogenic claudication        Plan:       Bronchitis  Comments:  resolving well no need for further abx    Type 2 diabetes mellitus without complication, without long-term current use of insulin  Comments:  stable    Lumbar compression fracture, closed, initial encounter    Spinal stenosis of lumbar region without neurogenic claudication  Comments:  has appt with new NS

## 2019-02-26 ENCOUNTER — TELEPHONE (OUTPATIENT)
Dept: PRIMARY CARE CLINIC | Facility: CLINIC | Age: 74
End: 2019-02-26

## 2019-02-26 DIAGNOSIS — M51.9 LUMBAR DISC DISEASE: ICD-10-CM

## 2019-02-26 DIAGNOSIS — E11.9 TYPE 2 DIABETES MELLITUS WITHOUT COMPLICATION, WITHOUT LONG-TERM CURRENT USE OF INSULIN: ICD-10-CM

## 2019-02-26 RX ORDER — ATORVASTATIN CALCIUM 10 MG/1
10 TABLET, FILM COATED ORAL DAILY
Qty: 90 TABLET | Refills: 3 | Status: SHIPPED | OUTPATIENT
Start: 2019-02-26

## 2019-02-26 RX ORDER — AMLODIPINE BESYLATE 10 MG/1
10 TABLET ORAL DAILY
Qty: 90 TABLET | Refills: 3 | Status: SHIPPED | OUTPATIENT
Start: 2019-02-26

## 2019-02-26 RX ORDER — OLMESARTAN MEDOXOMIL 40 MG/1
40 TABLET ORAL DAILY
Qty: 90 TABLET | Refills: 3 | Status: SHIPPED | OUTPATIENT
Start: 2019-02-26 | End: 2020-02-26

## 2019-02-26 RX ORDER — SITAGLIPTIN 100 MG/1
100 TABLET, FILM COATED ORAL DAILY
Qty: 90 TABLET | Refills: 3 | Status: SHIPPED | OUTPATIENT
Start: 2019-02-26

## 2019-02-26 RX ORDER — METFORMIN HYDROCHLORIDE 1000 MG/1
1000 TABLET ORAL 2 TIMES DAILY
Qty: 90 TABLET | Refills: 3 | Status: SHIPPED | OUTPATIENT
Start: 2019-02-26

## 2019-02-26 RX ORDER — GLIMEPIRIDE 4 MG/1
4 TABLET ORAL 2 TIMES DAILY
Qty: 180 TABLET | Refills: 1 | Status: SHIPPED | OUTPATIENT
Start: 2019-02-26

## 2019-03-20 DIAGNOSIS — Z12.39 BREAST CANCER SCREENING: ICD-10-CM

## 2019-03-20 DIAGNOSIS — Z12.11 COLON CANCER SCREENING: ICD-10-CM

## 2019-07-22 DIAGNOSIS — E11.9 TYPE 2 DIABETES MELLITUS WITHOUT COMPLICATION: ICD-10-CM

## 2019-08-23 DIAGNOSIS — E11.9 TYPE 2 DIABETES MELLITUS WITHOUT COMPLICATION: ICD-10-CM

## 2020-02-06 DIAGNOSIS — E11.9 TYPE 2 DIABETES MELLITUS WITHOUT COMPLICATION: ICD-10-CM
